# Patient Record
Sex: MALE | Race: WHITE | NOT HISPANIC OR LATINO | Employment: FULL TIME | URBAN - METROPOLITAN AREA
[De-identification: names, ages, dates, MRNs, and addresses within clinical notes are randomized per-mention and may not be internally consistent; named-entity substitution may affect disease eponyms.]

---

## 2023-05-17 LAB — TESTOSTERONE (NG/DL) IN SER/PLAS: 360 NG/DL (ref 240–1000)

## 2023-08-27 PROBLEM — G89.29 OTHER CHRONIC PAIN: Status: ACTIVE | Noted: 2023-08-27

## 2023-08-27 PROBLEM — E78.5 HYPERLIPIDEMIA: Status: ACTIVE | Noted: 2023-08-27

## 2023-08-27 PROBLEM — M41.9 SCOLIOSIS: Status: ACTIVE | Noted: 2023-08-27

## 2023-08-27 PROBLEM — K58.0 IRRITABLE BOWEL SYNDROME WITH DIARRHEA: Status: ACTIVE | Noted: 2023-08-27

## 2023-08-27 PROBLEM — G50.0 TRIGEMINAL NEURALGIA: Status: ACTIVE | Noted: 2023-08-27

## 2023-08-27 PROBLEM — R00.1 SINUS BRADYCARDIA: Status: ACTIVE | Noted: 2023-08-27

## 2023-08-27 PROBLEM — M47.812 CERVICAL SPINE DEGENERATION: Status: ACTIVE | Noted: 2023-08-27

## 2023-08-27 PROBLEM — E29.1 MALE HYPOGONADISM: Status: ACTIVE | Noted: 2023-08-27

## 2023-08-27 PROBLEM — R10.13 EPIGASTRIC PAIN: Status: ACTIVE | Noted: 2023-08-27

## 2023-08-27 PROBLEM — K20.90 ESOPHAGITIS: Status: ACTIVE | Noted: 2023-08-27

## 2023-08-27 PROBLEM — G47.33 OBSTRUCTIVE SLEEP APNEA SYNDROME: Status: ACTIVE | Noted: 2023-08-27

## 2023-08-27 PROBLEM — N52.9 MALE ERECTILE DYSFUNCTION, UNSPECIFIED: Status: ACTIVE | Noted: 2023-08-27

## 2023-08-27 RX ORDER — TADALAFIL 10 MG/1
1-2 TABLET ORAL DAILY PRN
COMMUNITY
Start: 2021-12-07 | End: 2023-10-31 | Stop reason: ALTCHOICE

## 2023-08-27 RX ORDER — CALC/MAG/B COMPLEX/D3/HERB 61
2 TABLET ORAL AS NEEDED
COMMUNITY
End: 2023-10-31 | Stop reason: ALTCHOICE

## 2023-08-27 RX ORDER — CALCIUM CARBONATE 200(500)MG
1 TABLET,CHEWABLE ORAL 4 TIMES DAILY PRN
COMMUNITY
End: 2023-10-31 | Stop reason: ALTCHOICE

## 2023-08-27 RX ORDER — ROSUVASTATIN CALCIUM 10 MG/1
1 TABLET, COATED ORAL DAILY
COMMUNITY

## 2023-08-27 RX ORDER — SILDENAFIL CITRATE 20 MG/1
1 TABLET ORAL 2 TIMES DAILY
COMMUNITY
End: 2023-10-31 | Stop reason: ALTCHOICE

## 2023-08-27 RX ORDER — LANSOPRAZOLE 30 MG/1
TABLET, ORALLY DISINTEGRATING, DELAYED RELEASE ORAL
COMMUNITY
End: 2023-10-31 | Stop reason: ALTCHOICE

## 2023-08-27 RX ORDER — LANOLIN ALCOHOL/MO/W.PET/CERES
1 CREAM (GRAM) TOPICAL DAILY
COMMUNITY

## 2023-08-27 RX ORDER — TADALAFIL 5 MG/1
1 TABLET ORAL DAILY
COMMUNITY
Start: 2022-02-02 | End: 2023-10-31 | Stop reason: ALTCHOICE

## 2023-08-27 RX ORDER — ASCORBIC ACID 500 MG
TABLET,CHEWABLE ORAL
COMMUNITY
End: 2023-10-31 | Stop reason: ALTCHOICE

## 2023-08-27 RX ORDER — OXCARBAZEPINE 300 MG/1
1 TABLET, FILM COATED ORAL 2 TIMES DAILY
COMMUNITY

## 2023-08-27 RX ORDER — OMEPRAZOLE 40 MG/1
1 CAPSULE, DELAYED RELEASE ORAL
COMMUNITY
Start: 2022-08-23

## 2023-08-27 RX ORDER — ACETAMINOPHEN 500 MG
TABLET ORAL
COMMUNITY

## 2023-08-27 RX ORDER — PIMECROLIMUS 10 MG/G
1 CREAM TOPICAL 2 TIMES DAILY
COMMUNITY

## 2023-10-31 ENCOUNTER — OFFICE VISIT (OUTPATIENT)
Dept: PRIMARY CARE | Facility: CLINIC | Age: 58
End: 2023-10-31
Payer: COMMERCIAL

## 2023-10-31 VITALS
HEART RATE: 66 BPM | HEIGHT: 70 IN | DIASTOLIC BLOOD PRESSURE: 74 MMHG | WEIGHT: 180 LBS | OXYGEN SATURATION: 96 % | SYSTOLIC BLOOD PRESSURE: 128 MMHG | BODY MASS INDEX: 25.77 KG/M2

## 2023-10-31 DIAGNOSIS — Z00.00 ROUTINE GENERAL MEDICAL EXAMINATION AT A HEALTH CARE FACILITY: Primary | ICD-10-CM

## 2023-10-31 DIAGNOSIS — E78.2 MIXED HYPERLIPIDEMIA: ICD-10-CM

## 2023-10-31 DIAGNOSIS — Z13.1 SCREENING FOR DIABETES MELLITUS: ICD-10-CM

## 2023-10-31 DIAGNOSIS — N52.9 ERECTILE DYSFUNCTION, UNSPECIFIED ERECTILE DYSFUNCTION TYPE: ICD-10-CM

## 2023-10-31 DIAGNOSIS — Z13.220 LIPID SCREENING: ICD-10-CM

## 2023-10-31 DIAGNOSIS — Z12.5 SCREENING PSA (PROSTATE SPECIFIC ANTIGEN): ICD-10-CM

## 2023-10-31 DIAGNOSIS — R79.89 LOW TESTOSTERONE: ICD-10-CM

## 2023-10-31 DIAGNOSIS — L20.9 ATOPIC DERMATITIS OF SCALP: ICD-10-CM

## 2023-10-31 PROBLEM — I83.90 VARICOSE VEINS OF CALF: Status: ACTIVE | Noted: 2023-10-31

## 2023-10-31 LAB
POC APPEARANCE, URINE: CLEAR
POC BILIRUBIN, URINE: NEGATIVE
POC BLOOD, URINE: ABNORMAL
POC COLOR, URINE: YELLOW
POC GLUCOSE, URINE: NEGATIVE MG/DL
POC KETONES, URINE: NEGATIVE MG/DL
POC LEUKOCYTES, URINE: NEGATIVE
POC NITRITE,URINE: NEGATIVE
POC PH, URINE: 7 PH
POC PROTEIN, URINE: NEGATIVE MG/DL
POC SPECIFIC GRAVITY, URINE: 1.02
POC UROBILINOGEN, URINE: 0.2 EU/DL

## 2023-10-31 PROCEDURE — 81003 URINALYSIS AUTO W/O SCOPE: CPT | Mod: QW | Performed by: FAMILY MEDICINE

## 2023-10-31 PROCEDURE — 99396 PREV VISIT EST AGE 40-64: CPT | Performed by: FAMILY MEDICINE

## 2023-10-31 PROCEDURE — 1036F TOBACCO NON-USER: CPT | Performed by: FAMILY MEDICINE

## 2023-10-31 RX ORDER — TADALAFIL 5 MG/1
5 TABLET ORAL DAILY
Qty: 30 TABLET | Refills: 11 | Status: SHIPPED | OUTPATIENT
Start: 2023-10-31 | End: 2024-10-30

## 2023-10-31 RX ORDER — MULTIVITAMIN/IRON/FOLIC ACID 18MG-0.4MG
1 TABLET ORAL DAILY
COMMUNITY

## 2023-10-31 ASSESSMENT — ENCOUNTER SYMPTOMS
DIARRHEA: 0
ABDOMINAL PAIN: 0
MUSCULOSKELETAL NEGATIVE: 1
CONSTIPATION: 1
RESPIRATORY NEGATIVE: 1
NUMBNESS: 1
CARDIOVASCULAR NEGATIVE: 1
PSYCHIATRIC NEGATIVE: 1
ABDOMINAL DISTENTION: 0
CONSTITUTIONAL NEGATIVE: 1
EYES NEGATIVE: 1

## 2023-10-31 ASSESSMENT — PAIN SCALES - GENERAL: PAINLEVEL: 0-NO PAIN

## 2023-10-31 ASSESSMENT — PATIENT HEALTH QUESTIONNAIRE - PHQ9
2. FEELING DOWN, DEPRESSED OR HOPELESS: NOT AT ALL
SUM OF ALL RESPONSES TO PHQ9 QUESTIONS 1 AND 2: 0
1. LITTLE INTEREST OR PLEASURE IN DOING THINGS: NOT AT ALL

## 2023-10-31 NOTE — PROGRESS NOTES
"Subjective   Patient ID: Mitul Nair is a 57 y.o. male who presents for Annual Exam.    58 yo     TGN  followed by neurology, no pain x month.  Will discuss with reducing dose.      Cervical stenosis-  still doing PT.  Will be getting EMG of right arm.  Tolerating meloxicam     Chronic esophagitis- EGD/colonsoscopy last year.            Review of Systems   Constitutional: Negative.    HENT: Negative.     Eyes: Negative.    Respiratory: Negative.     Cardiovascular: Negative.    Gastrointestinal:  Positive for constipation. Negative for abdominal distention, abdominal pain and diarrhea.   Endocrine:        Low sex drive, ED   Genitourinary: Negative.    Musculoskeletal: Negative.    Neurological:  Positive for numbness.   Psychiatric/Behavioral: Negative.         Objective   /74   Pulse 66   Ht 1.778 m (5' 10\")   Wt 81.6 kg (180 lb)   SpO2 96%   BMI 25.83 kg/m²     Physical Exam  Constitutional:       General: He is not in acute distress.     Appearance: Normal appearance. He is not ill-appearing.   HENT:      Right Ear: Tympanic membrane, ear canal and external ear normal. There is no impacted cerumen.      Left Ear: Tympanic membrane, ear canal and external ear normal.      Nose: Nose normal.      Mouth/Throat:      Pharynx: Oropharynx is clear. No posterior oropharyngeal erythema.   Eyes:      Extraocular Movements: Extraocular movements intact.      Pupils: Pupils are equal, round, and reactive to light.   Neck:      Thyroid: No thyroid mass or thyroid tenderness.      Vascular: No carotid bruit.   Cardiovascular:      Rate and Rhythm: Normal rate and regular rhythm.      Pulses:           Radial pulses are 2+ on the right side and 2+ on the left side.        Dorsalis pedis pulses are 2+ on the right side and 2+ on the left side.      Heart sounds: Normal heart sounds. No murmur heard.     No friction rub. No gallop.   Pulmonary:      Effort: Pulmonary effort is normal.      Breath sounds: Normal " breath sounds.   Abdominal:      General: Bowel sounds are normal.      Palpations: Abdomen is soft. There is no hepatomegaly or splenomegaly.      Tenderness: There is no abdominal tenderness.   Musculoskeletal:      Cervical back: Normal range of motion. No tenderness.      Right lower leg: No edema.      Left lower leg: No edema.      Comments: No gross abnormalities    Lymphadenopathy:      Cervical: No cervical adenopathy.      Upper Body:      Right upper body: No supraclavicular adenopathy.      Left upper body: No supraclavicular adenopathy.   Skin:     General: Skin is warm.      Capillary Refill: Capillary refill takes 2 to 3 seconds.      Findings: Erythema present.      Comments: Inflammatory changes scalp.  Mild around the nose.    Neurological:      General: No focal deficit present.      Mental Status: He is alert.      Cranial Nerves: Cranial nerves 2-12 are intact.      Coordination: Coordination is intact.      Gait: Gait is intact.      Comments: No obvious neurological deficits    Psychiatric:         Mood and Affect: Mood and affect normal.         Assessment/Plan   Diagnoses and all orders for this visit:  Routine general medical examination at a health care facility  -     POCT UA Automated manually resulted  Mixed hyperlipidemia  Lipid screening  -     Lipid Panel; Future  Erectile dysfunction, unspecified erectile dysfunction type  -     tadalafil (Cialis) 5 mg tablet; Take 1 tablet (5 mg) by mouth once daily.  Screening PSA (prostate specific antigen)  -     Prostate Specific Antigen, Screen; Future  Screening for diabetes mellitus  -     Comprehensive Metabolic Panel; Future  Low testosterone  -     Testosterone,Total, LC-MS/MS; Future  Atopic dermatitis of scalp    Consider punch biopsy.

## 2023-11-18 ENCOUNTER — LAB (OUTPATIENT)
Dept: LAB | Facility: LAB | Age: 58
End: 2023-11-18
Payer: COMMERCIAL

## 2023-11-18 DIAGNOSIS — R79.89 LOW TESTOSTERONE: ICD-10-CM

## 2023-11-18 DIAGNOSIS — Z13.220 LIPID SCREENING: ICD-10-CM

## 2023-11-18 DIAGNOSIS — Z13.1 SCREENING FOR DIABETES MELLITUS: ICD-10-CM

## 2023-11-18 DIAGNOSIS — Z12.5 SCREENING PSA (PROSTATE SPECIFIC ANTIGEN): ICD-10-CM

## 2023-11-18 LAB
ALBUMIN SERPL BCP-MCNC: 4.5 G/DL (ref 3.4–5)
ALP SERPL-CCNC: 94 U/L (ref 33–120)
ALT SERPL W P-5'-P-CCNC: 39 U/L (ref 10–52)
ANION GAP SERPL CALC-SCNC: 9 MMOL/L (ref 10–20)
AST SERPL W P-5'-P-CCNC: 22 U/L (ref 9–39)
BILIRUB SERPL-MCNC: 0.5 MG/DL (ref 0–1.2)
BUN SERPL-MCNC: 11 MG/DL (ref 6–23)
CALCIUM SERPL-MCNC: 9.3 MG/DL (ref 8.6–10.3)
CHLORIDE SERPL-SCNC: 104 MMOL/L (ref 98–107)
CHOLEST SERPL-MCNC: 161 MG/DL (ref 0–199)
CHOLESTEROL/HDL RATIO: 3.2
CO2 SERPL-SCNC: 32 MMOL/L (ref 21–32)
CREAT SERPL-MCNC: 1.11 MG/DL (ref 0.5–1.3)
GFR SERPL CREATININE-BSD FRML MDRD: 78 ML/MIN/1.73M*2
GLUCOSE SERPL-MCNC: 95 MG/DL (ref 74–99)
HDLC SERPL-MCNC: 50.5 MG/DL
LDLC SERPL CALC-MCNC: 91 MG/DL
NON HDL CHOLESTEROL: 111 MG/DL (ref 0–149)
POTASSIUM SERPL-SCNC: 4.3 MMOL/L (ref 3.5–5.3)
PROT SERPL-MCNC: 7.3 G/DL (ref 6.4–8.2)
PSA SERPL-MCNC: 0.56 NG/ML
SODIUM SERPL-SCNC: 141 MMOL/L (ref 136–145)
TRIGL SERPL-MCNC: 98 MG/DL (ref 0–149)
VLDL: 20 MG/DL (ref 0–40)

## 2023-11-18 PROCEDURE — 36415 COLL VENOUS BLD VENIPUNCTURE: CPT

## 2023-11-18 PROCEDURE — 84270 ASSAY OF SEX HORMONE GLOBUL: CPT

## 2023-11-18 PROCEDURE — 80061 LIPID PANEL: CPT

## 2023-11-18 PROCEDURE — 84153 ASSAY OF PSA TOTAL: CPT

## 2023-11-18 PROCEDURE — 80053 COMPREHEN METABOLIC PANEL: CPT

## 2023-11-23 LAB — TESTOSTERONE,TOTAL,LC-MS/MS: 432 NG/DL (ref 250–1100)

## 2023-11-24 ENCOUNTER — TELEPHONE (OUTPATIENT)
Dept: PRIMARY CARE | Facility: CLINIC | Age: 58
End: 2023-11-24
Payer: COMMERCIAL

## 2024-09-30 DIAGNOSIS — E78.2 MIXED HYPERLIPIDEMIA: Primary | ICD-10-CM

## 2024-09-30 RX ORDER — ROSUVASTATIN CALCIUM 10 MG/1
10 TABLET, COATED ORAL DAILY
Qty: 90 TABLET | Refills: 3 | Status: SHIPPED | OUTPATIENT
Start: 2024-09-30

## 2024-11-14 ENCOUNTER — APPOINTMENT (OUTPATIENT)
Dept: PRIMARY CARE | Facility: CLINIC | Age: 59
End: 2024-11-14
Payer: COMMERCIAL

## 2024-11-20 ENCOUNTER — OFFICE VISIT (OUTPATIENT)
Dept: PRIMARY CARE | Facility: CLINIC | Age: 59
End: 2024-11-20
Payer: COMMERCIAL

## 2024-11-20 VITALS
HEIGHT: 70 IN | SYSTOLIC BLOOD PRESSURE: 122 MMHG | HEART RATE: 91 BPM | OXYGEN SATURATION: 98 % | BODY MASS INDEX: 27.2 KG/M2 | DIASTOLIC BLOOD PRESSURE: 80 MMHG | WEIGHT: 190 LBS

## 2024-11-20 DIAGNOSIS — Z12.5 SCREENING PSA (PROSTATE SPECIFIC ANTIGEN): ICD-10-CM

## 2024-11-20 DIAGNOSIS — Z13.1 SCREENING FOR DIABETES MELLITUS: ICD-10-CM

## 2024-11-20 DIAGNOSIS — Z13.220 LIPID SCREENING: ICD-10-CM

## 2024-11-20 DIAGNOSIS — G50.0 TRIGEMINAL NEURALGIA: ICD-10-CM

## 2024-11-20 DIAGNOSIS — Z00.00 ANNUAL PHYSICAL EXAM: Primary | ICD-10-CM

## 2024-11-20 DIAGNOSIS — N52.9 ERECTILE DYSFUNCTION, UNSPECIFIED ERECTILE DYSFUNCTION TYPE: ICD-10-CM

## 2024-11-20 DIAGNOSIS — R68.82 LOSS OF LIBIDO: ICD-10-CM

## 2024-11-20 PROCEDURE — 81003 URINALYSIS AUTO W/O SCOPE: CPT | Performed by: FAMILY MEDICINE

## 2024-11-20 PROCEDURE — 99396 PREV VISIT EST AGE 40-64: CPT | Performed by: FAMILY MEDICINE

## 2024-11-20 PROCEDURE — 3008F BODY MASS INDEX DOCD: CPT | Performed by: FAMILY MEDICINE

## 2024-11-20 PROCEDURE — 1036F TOBACCO NON-USER: CPT | Performed by: FAMILY MEDICINE

## 2024-11-20 PROCEDURE — 99213 OFFICE O/P EST LOW 20 MIN: CPT | Performed by: FAMILY MEDICINE

## 2024-11-20 RX ORDER — TADALAFIL 5 MG/1
5 TABLET ORAL DAILY
Qty: 30 TABLET | Refills: 11 | Status: SHIPPED | OUTPATIENT
Start: 2024-11-20 | End: 2024-11-20 | Stop reason: SDUPTHER

## 2024-11-20 RX ORDER — TADALAFIL 5 MG/1
5 TABLET ORAL DAILY
Qty: 30 TABLET | Refills: 11 | Status: SHIPPED | OUTPATIENT
Start: 2024-11-20 | End: 2025-11-20

## 2024-11-20 ASSESSMENT — COLUMBIA-SUICIDE SEVERITY RATING SCALE - C-SSRS
6. HAVE YOU EVER DONE ANYTHING, STARTED TO DO ANYTHING, OR PREPARED TO DO ANYTHING TO END YOUR LIFE?: NO
2. HAVE YOU ACTUALLY HAD ANY THOUGHTS OF KILLING YOURSELF?: NO
6. HAVE YOU EVER DONE ANYTHING, STARTED TO DO ANYTHING, OR PREPARED TO DO ANYTHING TO END YOUR LIFE?: NO
1. IN THE PAST MONTH, HAVE YOU WISHED YOU WERE DEAD OR WISHED YOU COULD GO TO SLEEP AND NOT WAKE UP?: NO
1. IN THE PAST MONTH, HAVE YOU WISHED YOU WERE DEAD OR WISHED YOU COULD GO TO SLEEP AND NOT WAKE UP?: NO
2. HAVE YOU ACTUALLY HAD ANY THOUGHTS OF KILLING YOURSELF?: NO

## 2024-11-20 ASSESSMENT — PATIENT HEALTH QUESTIONNAIRE - PHQ9
2. FEELING DOWN, DEPRESSED OR HOPELESS: NOT AT ALL
1. LITTLE INTEREST OR PLEASURE IN DOING THINGS: NOT AT ALL
SUM OF ALL RESPONSES TO PHQ9 QUESTIONS 1 AND 2: 0
1. LITTLE INTEREST OR PLEASURE IN DOING THINGS: NOT AT ALL
SUM OF ALL RESPONSES TO PHQ9 QUESTIONS 1 AND 2: 0
2. FEELING DOWN, DEPRESSED OR HOPELESS: NOT AT ALL

## 2024-11-20 ASSESSMENT — ENCOUNTER SYMPTOMS
HEMATOLOGIC/LYMPHATIC NEGATIVE: 1
PSYCHIATRIC NEGATIVE: 1
CARDIOVASCULAR NEGATIVE: 1
HEADACHES: 1
MUSCULOSKELETAL NEGATIVE: 1
CONSTITUTIONAL NEGATIVE: 1
RESPIRATORY NEGATIVE: 1
GASTROINTESTINAL NEGATIVE: 1
OCCASIONAL FEELINGS OF UNSTEADINESS: 0
DEPRESSION: 0
LOSS OF SENSATION IN FEET: 0

## 2024-11-20 ASSESSMENT — PAIN SCALES - GENERAL: PAINLEVEL_OUTOF10: 8

## 2024-11-20 NOTE — PROGRESS NOTES
"[facility]: [facility]  PHYSICAL EXAM      Mitul Nair is a 58 y.o. male that is presenting today for Annual Exam (Trigeminal neuralgia \"flaring up\" per pt ).     Subjective   Pt here for annual exam.      He states TGN is coming back about 1 month ago.  Getting pain right jaw and occ right forehead.   Pain is rapid brief.  Gets about 10 per day lately.  He states he has identified some of the triggers.     ED- refills requested.      Hiking, gym, volleyball regularly.      Derm- rash intermittent.  Uses cream for rosacea    GI-better off lactose.      Sleep-wakes 2-3 x night.  Snoring.        Review of Systems   Constitutional: Negative.    HENT: Negative.     Respiratory: Negative.     Cardiovascular: Negative.    Gastrointestinal: Negative.    Genitourinary: Negative.    Musculoskeletal: Negative.    Skin: Negative.    Neurological:  Positive for headaches.   Hematological: Negative.    Psychiatric/Behavioral: Negative.         History    History reviewed. No pertinent past medical history.  Past Surgical History:   Procedure Laterality Date    OTHER SURGICAL HISTORY  02/02/2022    No history of surgery     Family History   Problem Relation Name Age of Onset    Melanoma Mother      Other (colon cancer) Mother      Other (pulmonary edema [Other]) Father      Alcohol abuse Father      Other (smoker) Father       No Known Allergies  Current Outpatient Medications on File Prior to Visit   Medication Sig Dispense Refill    b complex 0.4 mg tablet Take 1 tablet by mouth once daily.      cholecalciferol (Vitamin D-3) 50 mcg (2,000 unit) capsule Vitamin D3 50 MCG (2000 UT) Oral Capsule   Refills: 0       Active      cyanocobalamin (Vitamin B-12) 1,000 mcg tablet Take 1 tablet (1,000 mcg) by mouth once daily.      omeprazole (PriLOSEC) 40 mg DR capsule Take 1 capsule (40 mg) by mouth once daily in the morning. Take before meals.      pimecrolimus (Elidel) 1 % cream Apply 1 Application topically " 2 times a day.      rosuvastatin (Crestor) 10 mg tablet TAKE 1 TABLET BY MOUTH EVERY DAY 90 tablet 3    [DISCONTINUED] tadalafil (Cialis) 5 mg tablet Take 1 tablet (5 mg) by mouth once daily. 30 tablet 11    OXcarbazepine (Trileptal) 300 mg tablet Take 1 tablet (300 mg) by mouth 2 times a day. (Patient not taking: Reported on 11/20/2024)       No current facility-administered medications on file prior to visit.     Immunization History   Administered Date(s) Administered    Flu vaccine (IIV4), preservative free *Check age/dose* 10/27/2019, 10/22/2020, 10/27/2023    Hep A, Unspecified 01/22/2008    Hepatitis A vaccine, age 19 years and greater (HAVRIX) 12/07/2021, 10/27/2022    Hepatitis A vaccine, pediatric/adolescent (HAVRIX, VAQTA) 12/07/2021    Hepatitis B vaccine, 19 yrs and under (RECOMBIVAX, ENGERIX) 06/23/2022, 07/21/2022    Hepatitis B vaccine, adult *Check Product/Dose* 10/27/2022    Influenza, injectable, quadrivalent 10/01/2016, 10/26/2021, 10/27/2022    Influenza, seasonal, injectable 10/02/2014, 10/01/2015    Moderna COVID-19 vaccine, 12 years and older (50mcg/0.5mL)(Spikevax) 10/14/2023, 07/05/2024    Moderna SARS-CoV-2 Vaccination 11/27/2021, 06/05/2022    Pfizer Purple Cap SARS-CoV-2 03/21/2021, 04/11/2021, 06/05/2022    Td (adult), unspecified 02/17/2017, 04/11/2021    Td vaccine, age 7 years and older (TDVAX) 02/17/2017    Tdap vaccine, age 7 year and older (BOOSTRIX, ADACEL) 01/16/2007    Typhoid, Unspecified 01/22/2008    Zoster vaccine, recombinant, adult (SHINGRIX) 10/22/2020, 02/08/2021, 02/18/2021     Patient's medical history was reviewed and updated either before or during this encounter.    Objective   Vitals:    11/20/24 1028   BP: 122/80   Pulse: 91   SpO2: 98%      Physical Exam  Constitutional:       General: He is not in acute distress.     Appearance: Normal appearance. He is not ill-appearing.   HENT:      Right Ear: Tympanic membrane, ear canal and external ear normal. There is no  impacted cerumen.      Left Ear: Tympanic membrane, ear canal and external ear normal.      Nose: Nose normal.      Mouth/Throat:      Pharynx: Oropharynx is clear. No posterior oropharyngeal erythema.   Neck:      Thyroid: No thyroid mass or thyroid tenderness.      Vascular: No carotid bruit.   Cardiovascular:      Rate and Rhythm: Normal rate and regular rhythm.      Pulses:           Radial pulses are 2+ on the right side and 2+ on the left side.        Dorsalis pedis pulses are 2+ on the right side and 2+ on the left side.      Heart sounds: Normal heart sounds. No murmur heard.     No friction rub. No gallop.   Pulmonary:      Effort: Pulmonary effort is normal.      Breath sounds: Normal breath sounds.   Abdominal:      General: Bowel sounds are normal.      Palpations: Abdomen is soft. There is no hepatomegaly or splenomegaly.      Tenderness: There is no abdominal tenderness.   Musculoskeletal:      Cervical back: Normal range of motion. No tenderness.      Right lower leg: No edema.      Left lower leg: No edema.      Comments: No gross abnormalities    Lymphadenopathy:      Cervical: No cervical adenopathy.      Upper Body:      Right upper body: No supraclavicular adenopathy.      Left upper body: No supraclavicular adenopathy.   Skin:     General: Skin is warm.      Capillary Refill: Capillary refill takes 2 to 3 seconds.   Neurological:      General: No focal deficit present.      Mental Status: He is alert.      Cranial Nerves: Cranial nerves 2-12 are intact.      Coordination: Coordination is intact.      Gait: Gait is intact.      Comments: No obvious neurological deficits    Psychiatric:         Mood and Affect: Mood and affect normal.         Assessment/Plan      Patient Active Problem List   Diagnosis    Cervical spine degeneration    Epigastric pain    Esophagitis    Trigeminal neuralgia    Sinus bradycardia    Scoliosis    Other chronic pain    Obstructive sleep apnea syndrome    Male  hypogonadism    Male erectile dysfunction, unspecified    Irritable bowel syndrome with diarrhea    Hyperlipidemia    Varicose veins of calf     Diagnoses and all orders for this visit:  Annual physical exam  -     POCT UA Automated manually resulted  -     Comprehensive Metabolic Panel; Future  -     Lipid Panel; Future  Screening PSA (prostate specific antigen)  -     Prostate Specific Antigen, Screen; Future  Lipid screening  -     Comprehensive Metabolic Panel; Future  -     Lipid Panel; Future  Screening for diabetes mellitus  -     Comprehensive Metabolic Panel; Future  -     Lipid Panel; Future  Loss of libido  -     Testosterone,Total, LC-MS/MS; Future  Erectile dysfunction, unspecified erectile dysfunction type  -     tadalafil (Cialis) 5 mg tablet; Take 1 tablet (5 mg) by mouth once daily.  Trigeminal neuralgia  Discussed medication if gets severe.       The patient was encouraged to ensure that any/all documentation is accurate and up to date, and that our office be provided a copy in the event that anything changes.    Juancarlos Campbell MD

## 2024-11-21 ENCOUNTER — LAB (OUTPATIENT)
Dept: LAB | Facility: LAB | Age: 59
End: 2024-11-21
Payer: COMMERCIAL

## 2024-11-21 DIAGNOSIS — Z00.00 ANNUAL PHYSICAL EXAM: ICD-10-CM

## 2024-11-21 DIAGNOSIS — Z13.1 SCREENING FOR DIABETES MELLITUS: ICD-10-CM

## 2024-11-21 DIAGNOSIS — Z13.220 LIPID SCREENING: ICD-10-CM

## 2024-11-21 DIAGNOSIS — Z12.5 SCREENING PSA (PROSTATE SPECIFIC ANTIGEN): ICD-10-CM

## 2024-11-21 DIAGNOSIS — R68.82 LOSS OF LIBIDO: ICD-10-CM

## 2024-11-21 LAB
ALBUMIN SERPL BCP-MCNC: 4.7 G/DL (ref 3.4–5)
ALP SERPL-CCNC: 80 U/L (ref 33–120)
ALT SERPL W P-5'-P-CCNC: 37 U/L (ref 10–52)
ANION GAP SERPL CALC-SCNC: 10 MMOL/L (ref 10–20)
AST SERPL W P-5'-P-CCNC: 21 U/L (ref 9–39)
BILIRUB SERPL-MCNC: 0.6 MG/DL (ref 0–1.2)
BUN SERPL-MCNC: 12 MG/DL (ref 6–23)
CALCIUM SERPL-MCNC: 9.4 MG/DL (ref 8.6–10.3)
CHLORIDE SERPL-SCNC: 104 MMOL/L (ref 98–107)
CHOLEST SERPL-MCNC: 157 MG/DL (ref 0–199)
CHOLESTEROL/HDL RATIO: 3.4
CO2 SERPL-SCNC: 32 MMOL/L (ref 21–32)
CREAT SERPL-MCNC: 1.16 MG/DL (ref 0.5–1.3)
EGFRCR SERPLBLD CKD-EPI 2021: 73 ML/MIN/1.73M*2
GLUCOSE SERPL-MCNC: 96 MG/DL (ref 74–99)
HDLC SERPL-MCNC: 46.6 MG/DL
LDLC SERPL CALC-MCNC: 96 MG/DL
NON HDL CHOLESTEROL: 110 MG/DL (ref 0–149)
POTASSIUM SERPL-SCNC: 4.8 MMOL/L (ref 3.5–5.3)
PROT SERPL-MCNC: 7.3 G/DL (ref 6.4–8.2)
PSA SERPL-MCNC: 0.67 NG/ML
SODIUM SERPL-SCNC: 141 MMOL/L (ref 136–145)
TRIGL SERPL-MCNC: 73 MG/DL (ref 0–149)
VLDL: 15 MG/DL (ref 0–40)

## 2024-11-21 PROCEDURE — 36415 COLL VENOUS BLD VENIPUNCTURE: CPT

## 2024-11-21 PROCEDURE — 80053 COMPREHEN METABOLIC PANEL: CPT

## 2024-11-21 PROCEDURE — 84153 ASSAY OF PSA TOTAL: CPT

## 2024-11-21 PROCEDURE — 80061 LIPID PANEL: CPT

## 2024-11-21 PROCEDURE — 84270 ASSAY OF SEX HORMONE GLOBUL: CPT

## 2024-11-25 LAB — TESTOSTERONE,TOTAL,LC-MS/MS: 467 NG/DL (ref 250–1100)

## 2025-06-24 ENCOUNTER — APPOINTMENT (OUTPATIENT)
Dept: PRIMARY CARE | Facility: CLINIC | Age: 60
End: 2025-06-24
Payer: COMMERCIAL

## 2025-06-24 VITALS
TEMPERATURE: 97.5 F | SYSTOLIC BLOOD PRESSURE: 120 MMHG | HEIGHT: 70 IN | HEART RATE: 68 BPM | RESPIRATION RATE: 16 BRPM | WEIGHT: 187 LBS | BODY MASS INDEX: 26.77 KG/M2 | OXYGEN SATURATION: 97 % | DIASTOLIC BLOOD PRESSURE: 80 MMHG

## 2025-06-24 DIAGNOSIS — M54.31 SCIATICA OF RIGHT SIDE: Primary | ICD-10-CM

## 2025-06-24 DIAGNOSIS — L71.9 ROSACEA: ICD-10-CM

## 2025-06-24 PROCEDURE — 99203 OFFICE O/P NEW LOW 30 MIN: CPT | Performed by: FAMILY MEDICINE

## 2025-06-24 PROCEDURE — 3008F BODY MASS INDEX DOCD: CPT | Performed by: FAMILY MEDICINE

## 2025-06-24 PROCEDURE — 1036F TOBACCO NON-USER: CPT | Performed by: FAMILY MEDICINE

## 2025-06-24 RX ORDER — LATANOPROST 50 UG/ML
1 SOLUTION/ DROPS OPHTHALMIC NIGHTLY
COMMUNITY
Start: 2025-06-06

## 2025-06-24 RX ORDER — METRONIDAZOLE TOPICAL 7.5 MG/G
GEL TOPICAL 2 TIMES DAILY
COMMUNITY

## 2025-06-24 ASSESSMENT — ENCOUNTER SYMPTOMS
RESPIRATORY NEGATIVE: 1
CARDIOVASCULAR NEGATIVE: 1
CONSTITUTIONAL NEGATIVE: 1
NEUROLOGICAL NEGATIVE: 1
ARTHRALGIAS: 1

## 2025-06-24 ASSESSMENT — PATIENT HEALTH QUESTIONNAIRE - PHQ9
1. LITTLE INTEREST OR PLEASURE IN DOING THINGS: NOT AT ALL
SUM OF ALL RESPONSES TO PHQ9 QUESTIONS 1 & 2: 0
2. FEELING DOWN, DEPRESSED OR HOPELESS: NOT AT ALL

## 2025-06-24 NOTE — PROGRESS NOTES
"Subjective   Patient ID: Mitul Nair is a 59 y.o. male who presents for Sciatica (rosecea).    Overall patient is doing okay.  No chest pain or shortness of breath.  No numbness tingling.  Patient does have a history of low back pain.  In April he had a sciatic flare that he still feels at times currently.  He also had some discomfort in his right anterior hip inguinal area.  Felt like the pain radiated around.  He still felt that occasionally.   there are no bulges felt.  Medicines work okay.  Patient did see 3 different dermatologist for his rosacea.  This will come and go on its intensity.  Normal bowel bladder.    Neuropathy           Review of Systems   Constitutional: Negative.    Respiratory: Negative.     Cardiovascular: Negative.    Musculoskeletal:  Positive for arthralgias.   Skin:  Positive for rash.   Neurological: Negative.        Objective   /80 (BP Location: Right arm, Patient Position: Sitting, BP Cuff Size: Adult)   Pulse 68   Temp 36.4 °C (97.5 °F)   Resp 16   Ht 1.765 m (5' 9.5\")   Wt 84.8 kg (187 lb)   SpO2 97%   BMI 27.22 kg/m²     Physical Exam  Vitals reviewed.   Constitutional:       General: He is not in acute distress.     Appearance: Normal appearance. He is well-developed.   HENT:      Head: Normocephalic.   Cardiovascular:      Rate and Rhythm: Normal rate and regular rhythm.      Heart sounds: Normal heart sounds.   Pulmonary:      Effort: Pulmonary effort is normal.      Breath sounds: Normal breath sounds.   Musculoskeletal:         General: Normal range of motion.      Comments: Patient has normal flexion extension, neuroexam is normal, strength is normal.  Does have some tenderness to lower lumbar spine, no bulges felt in the lower abdominal area   Skin:     Findings: No rash.   Neurological:      Mental Status: He is alert.   Psychiatric:         Mood and Affect: Mood normal.         Behavior: Behavior normal.         Assessment/Plan   Problem List Items " Addressed This Visit    None  Visit Diagnoses         Sciatica of right side    -  Primary      Rosacea              Patient will continue his stretches and exercises.  Continue his current medications, if any chest pain shortness of breath any numbness tingling bowel bladder dysfunction saddle anesthesia any worsening back pain notify the office or go to the ER  Follow-up in 6 months for his physical sooner for any acute needs

## 2025-11-25 ENCOUNTER — APPOINTMENT (OUTPATIENT)
Dept: PRIMARY CARE | Facility: CLINIC | Age: 60
End: 2025-11-25
Payer: COMMERCIAL